# Patient Record
Sex: FEMALE | Race: WHITE | Employment: FULL TIME | URBAN - METROPOLITAN AREA
[De-identification: names, ages, dates, MRNs, and addresses within clinical notes are randomized per-mention and may not be internally consistent; named-entity substitution may affect disease eponyms.]

---

## 2017-03-20 ENCOUNTER — OFFICE VISIT (OUTPATIENT)
Dept: FAMILY MEDICINE CLINIC | Age: 37
End: 2017-03-20
Payer: COMMERCIAL

## 2017-03-20 VITALS
WEIGHT: 222 LBS | TEMPERATURE: 98.5 F | SYSTOLIC BLOOD PRESSURE: 135 MMHG | HEIGHT: 69 IN | HEART RATE: 86 BPM | RESPIRATION RATE: 16 BRPM | BODY MASS INDEX: 32.88 KG/M2 | DIASTOLIC BLOOD PRESSURE: 80 MMHG

## 2017-03-20 DIAGNOSIS — J02.0 ACUTE STREPTOCOCCAL PHARYNGITIS: Primary | ICD-10-CM

## 2017-03-20 PROCEDURE — 99213 OFFICE O/P EST LOW 20 MIN: CPT | Performed by: FAMILY MEDICINE

## 2017-03-20 RX ORDER — AMOXICILLIN AND CLAVULANATE POTASSIUM 875; 125 MG/1; MG/1
1 TABLET, FILM COATED ORAL 2 TIMES DAILY WITH MEALS
Qty: 20 TABLET | Refills: 0 | Status: SHIPPED | OUTPATIENT
Start: 2017-03-20 | End: 2017-03-30

## 2018-01-18 ENCOUNTER — HOSPITAL ENCOUNTER (OUTPATIENT)
Age: 38
Setting detail: SPECIMEN
Discharge: HOME | End: 2018-01-18
Payer: COMMERCIAL

## 2018-01-18 ENCOUNTER — OFFICE VISIT (OUTPATIENT)
Dept: FAMILY MEDICINE CLINIC | Age: 38
End: 2018-01-18
Payer: COMMERCIAL

## 2018-01-18 VITALS
DIASTOLIC BLOOD PRESSURE: 94 MMHG | WEIGHT: 222 LBS | HEIGHT: 69 IN | RESPIRATION RATE: 16 BRPM | HEART RATE: 70 BPM | BODY MASS INDEX: 32.88 KG/M2 | SYSTOLIC BLOOD PRESSURE: 144 MMHG

## 2018-01-18 DIAGNOSIS — Z01.419 ENCOUNTER FOR GYNECOLOGICAL EXAMINATION (GENERAL) (ROUTINE) WITHOUT ABNORMAL FINDINGS: ICD-10-CM

## 2018-01-18 DIAGNOSIS — F43.21 GRIEF REACTION: ICD-10-CM

## 2018-01-18 DIAGNOSIS — Z12.31 SCREENING MAMMOGRAM, ENCOUNTER FOR: ICD-10-CM

## 2018-01-18 DIAGNOSIS — Z01.419 ENCOUNTER FOR GYNECOLOGICAL EXAMINATION (GENERAL) (ROUTINE) WITHOUT ABNORMAL FINDINGS: Primary | ICD-10-CM

## 2018-01-18 LAB
ABSOLUTE EOS #: 0.14 K/UL (ref 0–0.44)
ABSOLUTE IMMATURE GRANULOCYTE: 0.03 K/UL (ref 0–0.3)
ABSOLUTE LYMPH #: 1.92 K/UL (ref 1.1–3.7)
ABSOLUTE MONO #: 0.51 K/UL (ref 0.1–1.2)
BASOPHILS # BLD: 1 % (ref 0–2)
BASOPHILS ABSOLUTE: 0.06 K/UL (ref 0–0.2)
DIFFERENTIAL TYPE: NORMAL
EOSINOPHILS RELATIVE PERCENT: 2 % (ref 1–4)
HCT VFR BLD CALC: 44.2 % (ref 36.3–47.1)
HEMOGLOBIN: 14.7 G/DL (ref 11.9–15.1)
IMMATURE GRANULOCYTES: 0 %
LYMPHOCYTES # BLD: 28 % (ref 24–43)
MCH RBC QN AUTO: 30.9 PG (ref 25.2–33.5)
MCHC RBC AUTO-ENTMCNC: 33.3 G/DL (ref 28.4–34.8)
MCV RBC AUTO: 92.9 FL (ref 82.6–102.9)
MONOCYTES # BLD: 8 % (ref 3–12)
NRBC AUTOMATED: 0 PER 100 WBC
PDW BLD-RTO: 12.2 % (ref 11.8–14.4)
PLATELET # BLD: 312 K/UL (ref 138–453)
PLATELET ESTIMATE: NORMAL
PMV BLD AUTO: 11.4 FL (ref 8.1–13.5)
PTH INTACT: 51.57 PG/ML (ref 15–65)
RBC # BLD: 4.76 M/UL (ref 3.95–5.11)
RBC # BLD: NORMAL 10*6/UL
SEG NEUTROPHILS: 61 % (ref 36–65)
SEGMENTED NEUTROPHILS ABSOLUTE COUNT: 4.14 K/UL (ref 1.5–8.1)
THYROXINE, FREE: 1.33 NG/DL (ref 0.93–1.7)
TSH SERPL DL<=0.05 MIU/L-ACNC: 1.92 MIU/L (ref 0.3–5)
VITAMIN D 25-HYDROXY: 31.7 NG/ML (ref 30–100)
WBC # BLD: 6.8 K/UL (ref 3.5–11.3)
WBC # BLD: NORMAL 10*3/UL

## 2018-01-18 PROCEDURE — 99395 PREV VISIT EST AGE 18-39: CPT | Performed by: FAMILY MEDICINE

## 2018-01-18 RX ORDER — ESCITALOPRAM OXALATE 10 MG/1
10 TABLET ORAL DAILY
Qty: 30 TABLET | Refills: 11 | Status: SHIPPED | OUTPATIENT
Start: 2018-01-18 | End: 2019-06-12

## 2018-01-18 ASSESSMENT — PATIENT HEALTH QUESTIONNAIRE - PHQ9
SUM OF ALL RESPONSES TO PHQ QUESTIONS 1-9: 2
2. FEELING DOWN, DEPRESSED OR HOPELESS: 1
1. LITTLE INTEREST OR PLEASURE IN DOING THINGS: 1
SUM OF ALL RESPONSES TO PHQ QUESTIONS 1-9: 1
2. FEELING DOWN, DEPRESSED OR HOPELESS: 1
SUM OF ALL RESPONSES TO PHQ9 QUESTIONS 1 & 2: 1
1. LITTLE INTEREST OR PLEASURE IN DOING THINGS: 0
SUM OF ALL RESPONSES TO PHQ9 QUESTIONS 1 & 2: 2

## 2018-01-19 LAB — THYROID PEROXIDASE (TPO) AB: 13.6 IU/ML (ref 0–35)

## 2018-01-26 ENCOUNTER — HOSPITAL ENCOUNTER (OUTPATIENT)
Dept: MAMMOGRAPHY | Age: 38
Discharge: HOME | End: 2018-01-26
Payer: COMMERCIAL

## 2018-01-26 DIAGNOSIS — Z12.31 SCREENING MAMMOGRAM, ENCOUNTER FOR: ICD-10-CM

## 2018-01-26 PROCEDURE — 77063 BREAST TOMOSYNTHESIS BI: CPT

## 2018-01-30 LAB — CYTOLOGY REPORT: NORMAL

## 2018-01-31 ENCOUNTER — PATIENT MESSAGE (OUTPATIENT)
Dept: FAMILY MEDICINE CLINIC | Age: 38
End: 2018-01-31

## 2018-02-02 ENCOUNTER — HOSPITAL ENCOUNTER (OUTPATIENT)
Dept: ULTRASOUND IMAGING | Age: 38
Discharge: HOME | End: 2018-02-04
Payer: COMMERCIAL

## 2018-02-02 DIAGNOSIS — R92.8 ABNORMAL MAMMOGRAM: ICD-10-CM

## 2018-02-02 PROCEDURE — 76642 ULTRASOUND BREAST LIMITED: CPT

## 2018-02-13 ENCOUNTER — OFFICE VISIT (OUTPATIENT)
Dept: FAMILY MEDICINE CLINIC | Age: 38
End: 2018-02-13
Payer: COMMERCIAL

## 2018-02-13 VITALS
BODY MASS INDEX: 32.58 KG/M2 | DIASTOLIC BLOOD PRESSURE: 76 MMHG | WEIGHT: 220 LBS | SYSTOLIC BLOOD PRESSURE: 122 MMHG | HEART RATE: 72 BPM | RESPIRATION RATE: 16 BRPM | HEIGHT: 69 IN

## 2018-02-13 DIAGNOSIS — M79.671 FOOT PAIN, RIGHT: Primary | ICD-10-CM

## 2018-02-13 PROCEDURE — 99213 OFFICE O/P EST LOW 20 MIN: CPT | Performed by: FAMILY MEDICINE

## 2018-02-13 NOTE — PROGRESS NOTES
[facility]  COMPREHENSIVE CARE  [phone]  [address] [address]  Dept: [phone]      Ladonna Patel is a 44 y.o. female who presents today for follow up on her  medical conditions as noted below. Chief Complaint   Patient presents with    Foot Pain     right foot pain. slipped on water-she did not fall but the side of her foot hurts. Patient Active Problem List:     Seasonal allergies     Migraine     Encounter for routine gynecological examination     Past Medical History:   Diagnosis Date    Headache(784.0)     migraines.  Seasonal allergies       Past Surgical History:   Procedure Laterality Date    TUBAL LIGATION  11/25/13    WISDOM TOOTH EXTRACTION       Family History   Problem Relation Age of Onset    Other Mother      kidney stones    Migraines Mother     Colon Polyps Father     Other Father      colitis    Migraines Father     Cancer Paternal Grandfather      pancreas    Cancer Paternal Grandmother      brain    Heart Disease Maternal Grandmother     Hypertension Maternal Grandmother     Hypertension Maternal Grandfather     Cancer Paternal Aunt      ovarian    Migraines Sister     Cancer Sister     Migraines Sister        Current Outpatient Prescriptions   Medication Sig Dispense Refill    Multiple Vitamins-Minerals (MULTIVITAMIN PO) Take  by mouth.  escitalopram (LEXAPRO) 10 MG tablet Take 1 tablet by mouth daily 30 tablet 11     No current facility-administered medications for this visit.       ALLERGIES:  No Known Allergies    Social History   Substance Use Topics    Smoking status: Never Smoker    Smokeless tobacco: Never Used    Alcohol use Yes      Comment: socially        No results found for: LDLCALC, LDLCHOLESTEROL, HDL, BUN, CREATININE, GLUCOSE, LABA1C, LABMICR           Subjective:      HPI  She is here today stating that she had slipped on some water injuring her lateral right foot area this is approximately 1 week

## 2018-02-14 DIAGNOSIS — M79.671 FOOT PAIN, RIGHT: ICD-10-CM

## 2018-03-27 ENCOUNTER — OFFICE VISIT (OUTPATIENT)
Dept: FAMILY MEDICINE CLINIC | Age: 38
End: 2018-03-27
Payer: COMMERCIAL

## 2018-03-27 VITALS
RESPIRATION RATE: 16 BRPM | DIASTOLIC BLOOD PRESSURE: 79 MMHG | OXYGEN SATURATION: 99 % | BODY MASS INDEX: 32.38 KG/M2 | WEIGHT: 218.6 LBS | TEMPERATURE: 97.7 F | SYSTOLIC BLOOD PRESSURE: 130 MMHG | HEART RATE: 68 BPM | HEIGHT: 69 IN

## 2018-03-27 DIAGNOSIS — J01.90 ACUTE NON-RECURRENT SINUSITIS, UNSPECIFIED LOCATION: Primary | ICD-10-CM

## 2018-03-27 PROCEDURE — 99213 OFFICE O/P EST LOW 20 MIN: CPT | Performed by: FAMILY MEDICINE

## 2018-03-27 RX ORDER — AZITHROMYCIN 250 MG/1
TABLET, FILM COATED ORAL
Qty: 6 TABLET | Refills: 0 | Status: SHIPPED | OUTPATIENT
Start: 2018-03-27 | End: 2018-04-06

## 2018-07-02 ENCOUNTER — OFFICE VISIT (OUTPATIENT)
Dept: FAMILY MEDICINE CLINIC | Age: 38
End: 2018-07-02
Payer: COMMERCIAL

## 2018-07-02 VITALS
OXYGEN SATURATION: 98 % | SYSTOLIC BLOOD PRESSURE: 136 MMHG | DIASTOLIC BLOOD PRESSURE: 89 MMHG | RESPIRATION RATE: 16 BRPM | HEART RATE: 87 BPM

## 2018-07-02 DIAGNOSIS — S83.242A ACUTE MEDIAL MENISCUS TEAR OF LEFT KNEE, INITIAL ENCOUNTER: Primary | ICD-10-CM

## 2018-07-02 PROCEDURE — 99213 OFFICE O/P EST LOW 20 MIN: CPT | Performed by: FAMILY MEDICINE

## 2018-07-02 RX ORDER — TRAMADOL HYDROCHLORIDE 50 MG/1
50 TABLET ORAL 2 TIMES DAILY
Qty: 12 TABLET | Refills: 0 | Status: SHIPPED | OUTPATIENT
Start: 2018-07-02 | End: 2018-07-08

## 2018-07-02 NOTE — PROGRESS NOTES
Subjective:      Farideh Jang is a 44 y.o. female who presents with a knee injury involving the left knee. Onset was sudden, related to recreational sports. Mechanism of injury: extension/flexion. Inciting event: injured while playing volley ball. Current symptoms include: pain located medial lig, popping sensation and swelling. Pain is aggravated by any weight bearing. Patient has had no prior knee problems. Evaluation to date: none. Treatment to date: avoidance of offending activity, ice, prescription NSAIDS which are not very effective and rest.    Patient's medications, allergies, past medical, surgical, social and family histories were reviewed and updated as appropriate. Review of Systems  Pertinent items are noted in HPI. Objective:      /89   Pulse 87   Resp 16   SpO2 98%   Right knee: normal and no effusion, full active range of motion, no joint line tenderness, ligamentous structures intact. Left knee:  positive exam findings: effusion, medial joint line tenderness, erythema and tenderness noted medial, distal     X-ray left knee: not ordered. Luis Cid was seen today for knee injury. Diagnoses and all orders for this visit:    Acute medial meniscus tear of left knee, initial encounter  -     MRI KNEE LEFT W WO CONTRAST; Future  -     [address]  -     traMADol (ULTRAM) 50 MG tablet; Take 1 tablet by mouth 2 times daily for 6 days. Intended supply: 3 days. Take lowest dose possible to manage pain. this patient had an acute knee injury. She heardt to pop. I will order MRI. She will follow up with orthopedic surgeon. Continue ice, continue elevation take tramadol and also NSAIDs for pain relief. Call or return to clinic prn if these symptoms worsen or fail to improve as anticipated. I have reviewed the instructions with the patient, answering all questions to her satisfaction.

## 2018-07-03 DIAGNOSIS — S83.242A ACUTE MEDIAL MENISCUS TEAR OF LEFT KNEE, INITIAL ENCOUNTER: Primary | ICD-10-CM

## 2018-07-10 ENCOUNTER — HOSPITAL ENCOUNTER (OUTPATIENT)
Dept: MRI IMAGING | Age: 38
Discharge: HOME | End: 2018-07-12
Payer: COMMERCIAL

## 2018-07-10 DIAGNOSIS — S83.242A ACUTE MEDIAL MENISCUS TEAR OF LEFT KNEE, INITIAL ENCOUNTER: ICD-10-CM

## 2018-07-10 PROCEDURE — 73721 MRI JNT OF LWR EXTRE W/O DYE: CPT

## 2019-02-13 ENCOUNTER — OFFICE VISIT (OUTPATIENT)
Dept: FAMILY MEDICINE CLINIC | Age: 39
End: 2019-02-13
Payer: COMMERCIAL

## 2019-02-13 VITALS
BODY MASS INDEX: 34.51 KG/M2 | HEIGHT: 69 IN | TEMPERATURE: 98 F | WEIGHT: 233 LBS | HEART RATE: 80 BPM | RESPIRATION RATE: 16 BRPM | DIASTOLIC BLOOD PRESSURE: 87 MMHG | SYSTOLIC BLOOD PRESSURE: 134 MMHG

## 2019-02-13 DIAGNOSIS — J01.90 ACUTE NON-RECURRENT SINUSITIS, UNSPECIFIED LOCATION: Primary | ICD-10-CM

## 2019-02-13 PROCEDURE — 99213 OFFICE O/P EST LOW 20 MIN: CPT | Performed by: FAMILY MEDICINE

## 2019-02-13 RX ORDER — AMOXICILLIN AND CLAVULANATE POTASSIUM 875; 125 MG/1; MG/1
1 TABLET, FILM COATED ORAL 2 TIMES DAILY WITH MEALS
Qty: 20 TABLET | Refills: 0 | Status: SHIPPED | OUTPATIENT
Start: 2019-02-13 | End: 2019-02-23

## 2019-02-13 ASSESSMENT — PATIENT HEALTH QUESTIONNAIRE - PHQ9
SUM OF ALL RESPONSES TO PHQ9 QUESTIONS 1 & 2: 0
SUM OF ALL RESPONSES TO PHQ QUESTIONS 1-9: 0
2. FEELING DOWN, DEPRESSED OR HOPELESS: 0
SUM OF ALL RESPONSES TO PHQ QUESTIONS 1-9: 0
1. LITTLE INTEREST OR PLEASURE IN DOING THINGS: 0

## 2019-06-12 ENCOUNTER — OFFICE VISIT (OUTPATIENT)
Dept: FAMILY MEDICINE CLINIC | Age: 39
End: 2019-06-12
Payer: COMMERCIAL

## 2019-06-12 VITALS
WEIGHT: 232 LBS | HEIGHT: 69 IN | SYSTOLIC BLOOD PRESSURE: 134 MMHG | HEART RATE: 76 BPM | BODY MASS INDEX: 34.36 KG/M2 | RESPIRATION RATE: 16 BRPM | DIASTOLIC BLOOD PRESSURE: 89 MMHG

## 2019-06-12 DIAGNOSIS — Z00.00 WELL ADULT EXAM: ICD-10-CM

## 2019-06-12 DIAGNOSIS — J06.9 ACUTE URI: Primary | ICD-10-CM

## 2019-06-12 PROBLEM — S82.109A CLOSED FRACTURE OF UPPER END OF TIBIA: Status: ACTIVE | Noted: 2019-06-12

## 2019-06-12 PROCEDURE — 99213 OFFICE O/P EST LOW 20 MIN: CPT | Performed by: FAMILY MEDICINE

## 2019-06-12 RX ORDER — DOXYCYCLINE HYCLATE 100 MG
100 TABLET ORAL 2 TIMES DAILY
Qty: 20 TABLET | Refills: 0 | Status: SHIPPED | OUTPATIENT
Start: 2019-06-12 | End: 2019-06-22

## 2019-06-12 ASSESSMENT — PATIENT HEALTH QUESTIONNAIRE - PHQ9
1. LITTLE INTEREST OR PLEASURE IN DOING THINGS: 0
SUM OF ALL RESPONSES TO PHQ QUESTIONS 1-9: 0
SUM OF ALL RESPONSES TO PHQ QUESTIONS 1-9: 0
2. FEELING DOWN, DEPRESSED OR HOPELESS: 0
SUM OF ALL RESPONSES TO PHQ9 QUESTIONS 1 & 2: 0

## 2019-06-12 NOTE — PROGRESS NOTES
just cannot quit coughing she tried Mucinex allergy meds and then it seemed to be helping her    Review of Systems:     Constitutional: Negative for fever, appetite change and fatigue. Family social and medical history reviewed and unchanged     HENT: Negative. Negative for nosebleeds, trouble swallowing and neck pain. Eyes: Negative for photophobia and visual disturbance. Respiratory: Negative. Negative for chest tightness and shortness of breath. Cardiovascular: Negative. Negative for chest pain and leg swelling. Gastrointestinal: Negative. Negative for abdominal pain and blood in stool. Endocrine: Negative for cold intolerance and polyuria. Genitourinary: Negative for dysuria and hematuria. Musculoskeletal: Negative. Skin: Negative for rash. Allergic/Immunologic: Negative. Neurological: Negative. Negative for dizziness, weakness and numbness. Hematological: Negative. Negative for adenopathy. Does not bruise/bleed easily. Psychiatric/Behavioral: Negative for sleep disturbance, dysphoric mood and  decreased concentration. The patient is not nervous/anxious. Objective:     Physical Exam:     Nursing note and vitals reviewed. /89   Pulse 76   Resp 16   Ht 5' 8.9\" (1.75 m)   Wt 232 lb (105.2 kg)   Breastfeeding? No   BMI 34.36 kg/m²   Constitutional: She is oriented to person, place, and time. She   appears well-developed and well-nourished. HENT:   Head: Normocephalic and atraumatic. Right Ear: External ear normal. Tympanic membrane is not erythematous. No middle ear effusion. Left Ear: External ear normal. Tympanic membrane is not erythematous. No middle ear effusion. Nose: No mucosal edema. Mouth/Throat: Oropharynx is clear and moist. No posterior oropharyngeal erythema. Eyes: Conjunctivae and EOM are normal. Pupils are equal, round, and reactive to light. Neck: Normal range of motion. Neck supple. No thyromegaly present. Cardiovascular: Normal rate, regular rhythm and normal heart sounds. No murmur heard. Pulmonary/Chest: Effort normal and breath sounds normal. She has no wheezes. Shehas no rales. Abdominal: Soft. Bowel sounds are normal. She exhibits no distension and no mass. There is no tenderness. There is no rebound and no guarding. Genitourinary/Anorectal:deferred  Musculoskeletal: Normal range of motion. She exhibits no edema or tenderness. Lymphadenopathy: She has no cervical adenopathy. Neurological: She is alert and oriented to person, place, and time. She has normal reflexes. Skin: Skin is warm and dry. No rash noted. Psychiatric: She has a normal mood and affect. Her   behavior is normal.       Assessment:      1. Acute URI    2. Well adult exam          Plan:      Call or return to clinic prn if these symptoms worsen or fail to improve as anticipated. I have reviewed the instructions with the patient, answering all questions to her satisfaction. No follow-ups on file.   Orders Placed This Encounter   Procedures    CBC Auto Differential     Standing Status:   Future     Standing Expiration Date:   6/11/2020    Comprehensive Metabolic Panel     Standing Status:   Future     Standing Expiration Date:   6/11/2020    Lipid Panel     Standing Status:   Future     Standing Expiration Date:   6/11/2020     Order Specific Question:   Is Patient Fasting?/# of Hours     Answer:   yes    T4, Free     Standing Status:   Future     Standing Expiration Date:   6/11/2020    Thyroid Peroxidase Antibody     Standing Status:   Future     Standing Expiration Date:   6/11/2020    TSH without Reflex     Standing Status:   Future     Standing Expiration Date:   6/11/2020    Vitamin D 25 Hydroxy     Standing Status:   Future     Standing Expiration Date:   6/12/2020    PTH, Intact     Standing Status:   Future     Standing Expiration Date:   6/12/2020     Orders Placed This Encounter   Medications    doxycycline hyclate (VIBRA-TABS) 100 MG tablet     Sig: Take 1 tablet by mouth 2 times daily for 10 days     Dispense:  20 tablet     Refill:  0     Cont Mucinex   Electronically signed by Austin Pham DO on 6/12/2019 at 2:00 PM

## 2019-09-20 ENCOUNTER — OFFICE VISIT (OUTPATIENT)
Dept: ORTHOPEDIC SURGERY | Age: 39
End: 2019-09-20
Payer: COMMERCIAL

## 2019-09-20 VITALS
HEIGHT: 69 IN | SYSTOLIC BLOOD PRESSURE: 138 MMHG | WEIGHT: 238.8 LBS | DIASTOLIC BLOOD PRESSURE: 88 MMHG | BODY MASS INDEX: 35.37 KG/M2

## 2019-09-20 DIAGNOSIS — M25.562 LEFT KNEE PAIN, UNSPECIFIED CHRONICITY: Primary | ICD-10-CM

## 2019-09-20 PROCEDURE — 20611 DRAIN/INJ JOINT/BURSA W/US: CPT | Performed by: PHYSICIAN ASSISTANT

## 2019-09-20 PROCEDURE — 99213 OFFICE O/P EST LOW 20 MIN: CPT | Performed by: PHYSICIAN ASSISTANT

## 2019-09-20 RX ORDER — METHYLPREDNISOLONE ACETATE 40 MG/ML
40 INJECTION, SUSPENSION INTRA-ARTICULAR; INTRALESIONAL; INTRAMUSCULAR; SOFT TISSUE ONCE
Status: COMPLETED | OUTPATIENT
Start: 2019-09-20 | End: 2019-09-20

## 2019-09-20 RX ORDER — IBUPROFEN 200 MG
200 TABLET ORAL PRN
COMMUNITY

## 2019-09-20 RX ORDER — COVID-19 ANTIGEN TEST
KIT MISCELLANEOUS PRN
COMMUNITY
End: 2021-08-11

## 2019-09-20 RX ORDER — LIDOCAINE HYDROCHLORIDE 10 MG/ML
4 INJECTION, SOLUTION INFILTRATION; PERINEURAL ONCE
Status: COMPLETED | OUTPATIENT
Start: 2019-09-20 | End: 2019-09-20

## 2019-09-20 RX ADMIN — METHYLPREDNISOLONE ACETATE 40 MG: 40 INJECTION, SUSPENSION INTRA-ARTICULAR; INTRALESIONAL; INTRAMUSCULAR; SOFT TISSUE at 12:16

## 2019-09-20 RX ADMIN — LIDOCAINE HYDROCHLORIDE 4 ML: 10 INJECTION, SOLUTION INFILTRATION; PERINEURAL at 12:15

## 2019-09-20 ASSESSMENT — ENCOUNTER SYMPTOMS
CONSTIPATION: 0
SHORTNESS OF BREATH: 0
ABDOMINAL PAIN: 0
VOMITING: 0
DIARRHEA: 0
CHEST TIGHTNESS: 0
COUGH: 0
ABDOMINAL DISTENTION: 0
APNEA: 0
NAUSEA: 0
COLOR CHANGE: 0

## 2019-09-20 NOTE — PROGRESS NOTES
Negative. There is  No varus instability at 0 degrees and No varus instability at 30 degrees. There is No valgus instability at 0 degrees and No valgus instability at 30 degrees. SPECIAL: Trent test is positive with no clunks and no pain but there is crepitation. PALP: There is medial joint line pain. Medial tibial pain. Assessment:     1. Left knee pain, unspecified chronicity      Procedures:    Procedure: yes    Regular Knee Injection    Location: Left Knee  Procedure: Corticosteroid injection into the knee. The patient was placed in the Supine position on the exam table. The superior lateral portal was identified and marked. The skin was prepped with betadine in a sterile fashion. Utilizing ultrasound for precise placement and clean technique with sterile gloves a 5cc solution containing 4cc of 1.0% Lidocaine with 1cc containing 40mg of Depo-medrol  was injected. There was no resistance to the injection. The wound was cleansed and a band-aid was placed. the patient tolerated the procedure without difficulty. Adverse reactions to the injection were discussed with the patient including signs of infection (increasing pain, redness, swelling) and the patient was instructed to call immediately if experiencing any of these symptoms. Radiology:   Xr Knee Left (min 4 Views)    Result Date: 9/22/2019  KNEE X-RAY 4 views of the left knee including AP, bilateral tunnel, and lateral in the upright position, and skyline views reveal anatomic alignment with no fracture or dislocation. Kellgren grade II changes of osteoarthritis (joint space narrowing, osteophyte, subchondral sclerosis, bony deformity/cyst) of the tricompartment(s). No osseous loose bodies. No bony erosion or periosteal reaction. No soft tissue masses. 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       Plan:   Treatment : I reviewed the X-ray with the patient and I informed her that the knee has some mild degenerative changes with a Reason for exam:     Answer:   pain       I, Edward Day V, am scribing for and in the presence of  Leanna English ATC. 9/23/2019  9:45 AM      I, Laury Zimmerman PA-C, TYLER,  have personally seen this patient, reviewed the chart including history, and imaging if done. I personally  performed the physical exam and obtained any needed additional history. I placed orders, performed or supervised procedures and developed the treatment plan.     Electronically signed by Conrad Alvarado PA-C, on 9/23/2019 at 9:46 AM      Electronically signed by Conrad Alvarado PA-C, on 9/23/2019 at 9:45 AM

## 2019-09-20 NOTE — PATIENT INSTRUCTIONS
The injection site should never get red, hot, or swollen and if it does the patient will contact our office right away. The patient may experience a increase in soreness the first 24-48 hours due to a cortisone flair and can take anti-inflammatories for a short period of time to reduce that soreness. The patient should not submerge the injection site in water for a minimum of 24 hours to avoid infection. This means no lakes, pools, ponds, or hot tubs for 24 hours. If the patient is diabetic the injection may increase their blood sugar for up to one week. The patient can do this cortisone injection once every 3 months as needed.

## 2019-12-17 ENCOUNTER — OFFICE VISIT (OUTPATIENT)
Dept: FAMILY MEDICINE CLINIC | Age: 39
End: 2019-12-17
Payer: COMMERCIAL

## 2019-12-17 VITALS
BODY MASS INDEX: 34.98 KG/M2 | HEART RATE: 98 BPM | WEIGHT: 236.2 LBS | DIASTOLIC BLOOD PRESSURE: 85 MMHG | HEIGHT: 69 IN | RESPIRATION RATE: 16 BRPM | OXYGEN SATURATION: 97 % | SYSTOLIC BLOOD PRESSURE: 133 MMHG

## 2019-12-17 DIAGNOSIS — J10.1 INFLUENZA A: Primary | ICD-10-CM

## 2019-12-17 LAB
INFLUENZA A ANTIBODY: NORMAL
INFLUENZA B ANTIBODY: NORMAL

## 2019-12-17 PROCEDURE — 87804 INFLUENZA ASSAY W/OPTIC: CPT | Performed by: FAMILY MEDICINE

## 2019-12-17 PROCEDURE — 99213 OFFICE O/P EST LOW 20 MIN: CPT | Performed by: FAMILY MEDICINE

## 2019-12-17 RX ORDER — OSELTAMIVIR PHOSPHATE 75 MG/1
75 CAPSULE ORAL 2 TIMES DAILY
Qty: 10 CAPSULE | Refills: 0 | Status: SHIPPED | OUTPATIENT
Start: 2019-12-17 | End: 2019-12-22

## 2019-12-17 ASSESSMENT — PATIENT HEALTH QUESTIONNAIRE - PHQ9
1. LITTLE INTEREST OR PLEASURE IN DOING THINGS: 0
SUM OF ALL RESPONSES TO PHQ9 QUESTIONS 1 & 2: 0
SUM OF ALL RESPONSES TO PHQ QUESTIONS 1-9: 0
2. FEELING DOWN, DEPRESSED OR HOPELESS: 0
SUM OF ALL RESPONSES TO PHQ QUESTIONS 1-9: 0

## 2021-04-19 ENCOUNTER — TELEPHONE (OUTPATIENT)
Dept: FAMILY MEDICINE CLINIC | Age: 41
End: 2021-04-19

## 2021-08-11 ENCOUNTER — OFFICE VISIT (OUTPATIENT)
Dept: FAMILY MEDICINE CLINIC | Age: 41
End: 2021-08-11
Payer: COMMERCIAL

## 2021-08-11 VITALS
BODY MASS INDEX: 35.37 KG/M2 | OXYGEN SATURATION: 97 % | SYSTOLIC BLOOD PRESSURE: 134 MMHG | HEIGHT: 69 IN | HEART RATE: 95 BPM | DIASTOLIC BLOOD PRESSURE: 82 MMHG | WEIGHT: 238.8 LBS

## 2021-08-11 DIAGNOSIS — J30.2 SEASONAL ALLERGIES: ICD-10-CM

## 2021-08-11 DIAGNOSIS — G43.C0 PERIODIC HEADACHE SYNDROME, NOT INTRACTABLE: ICD-10-CM

## 2021-08-11 DIAGNOSIS — Z13.1 SCREENING FOR DIABETES MELLITUS: ICD-10-CM

## 2021-08-11 DIAGNOSIS — Z12.31 SCREENING MAMMOGRAM, ENCOUNTER FOR: ICD-10-CM

## 2021-08-11 DIAGNOSIS — Z00.00 WELL ADULT EXAM: ICD-10-CM

## 2021-08-11 DIAGNOSIS — M72.2 PLANTAR FASCIITIS, RIGHT: Primary | ICD-10-CM

## 2021-08-11 PROCEDURE — 99213 OFFICE O/P EST LOW 20 MIN: CPT | Performed by: FAMILY MEDICINE

## 2021-08-11 SDOH — ECONOMIC STABILITY: FOOD INSECURITY: WITHIN THE PAST 12 MONTHS, THE FOOD YOU BOUGHT JUST DIDN'T LAST AND YOU DIDN'T HAVE MONEY TO GET MORE.: NEVER TRUE

## 2021-08-11 SDOH — ECONOMIC STABILITY: FOOD INSECURITY: WITHIN THE PAST 12 MONTHS, YOU WORRIED THAT YOUR FOOD WOULD RUN OUT BEFORE YOU GOT MONEY TO BUY MORE.: NEVER TRUE

## 2021-08-11 ASSESSMENT — PATIENT HEALTH QUESTIONNAIRE - PHQ9
SUM OF ALL RESPONSES TO PHQ QUESTIONS 1-9: 0
1. LITTLE INTEREST OR PLEASURE IN DOING THINGS: 0
SUM OF ALL RESPONSES TO PHQ9 QUESTIONS 1 & 2: 0
2. FEELING DOWN, DEPRESSED OR HOPELESS: 0
SUM OF ALL RESPONSES TO PHQ QUESTIONS 1-9: 0
SUM OF ALL RESPONSES TO PHQ QUESTIONS 1-9: 0

## 2021-08-11 ASSESSMENT — SOCIAL DETERMINANTS OF HEALTH (SDOH): HOW HARD IS IT FOR YOU TO PAY FOR THE VERY BASICS LIKE FOOD, HOUSING, MEDICAL CARE, AND HEATING?: NOT HARD AT ALL

## 2021-08-11 NOTE — PROGRESS NOTES
[place]  [facility]  [id number] [facility] [address] [place] [address]  Dept: [phone]      Jennifer Chavarria is a 42 y.o. female who presents today for follow up on her  medical conditions as noted below. Chief Complaint   Patient presents with    Foot Pain     Right        Patient Active Problem List:     Seasonal allergies     Migraine     Closed fracture of upper end of tibia     Past Medical History:   Diagnosis Date    Headache(784.0)     migraines.  Seasonal allergies       Past Surgical History:   Procedure Laterality Date    TUBAL LIGATION  11/25/13    WISDOM TOOTH EXTRACTION       Family History   Problem Relation Age of Onset    Other Mother         kidney stones    Migraines Mother     Colon Polyps Father     Other Father         colitis    Migraines Father     Cancer Paternal Grandfather         pancreas    Cancer Paternal Grandmother         brain    Heart Disease Maternal Grandmother     Hypertension Maternal Grandmother     Hypertension Maternal Grandfather     Cancer Paternal Aunt         ovarian    Migraines Sister     Cancer Sister     Migraines Sister        Current Outpatient Medications   Medication Sig Dispense Refill    ibuprofen (ADVIL;MOTRIN) 200 MG tablet Take 200 mg by mouth as needed for Pain      Multiple Vitamins-Minerals (MULTIVITAMIN PO) Take  by mouth. No current facility-administered medications for this visit.      ALLERGIES:  No Known Allergies    Social History     Tobacco Use    Smoking status: Never Smoker    Smokeless tobacco: Never Used   Substance Use Topics    Alcohol use: Yes     Comment: socially        No results found for: LDLCALC, LDLCHOLESTEROL, HDL, BUN, CREATININE, GLUCOSE, LABA1C, LABMICR           Subjective:      HPI  He is here today stating for the last month or more she has been having pain on the bottom of her foot is worse when she first steps on it in the morning notes increasing in severity and hurting on her lateral foot and up into her Achilles tendon she does admit she wears flip-flops a lot lately she has been trying to wear tennis shoes  She has been seen in the office for over 2 almost 3years and has not had laboratory studies done a mammogram or Pap smear she states overall she has been feeling okay though    Review of Systems:     Constitutional: Negative for fever, appetite change and fatigue. Family social and medical history reviewed and unchanged     HENT: Negative. Negative for nosebleeds, trouble swallowing and neck pain. Eyes: Negative for photophobia and visual disturbance. Respiratory: Negative. Negative for chest tightness and shortness of breath. Cardiovascular: Negative. Negative for chest pain and leg swelling. Gastrointestinal: Negative. Negative for abdominal pain and blood in stool. Endocrine: Negative for cold intolerance and polyuria. Genitourinary: Negative for dysuria and hematuria. Musculoskeletal: Negative. Skin: Negative for rash. Allergic/Immunologic: Negative. Neurological: Negative. Negative for dizziness, weakness and numbness. Hematological: Negative. Negative for adenopathy. Does not bruise/bleed easily. Psychiatric/Behavioral: Negative for sleep disturbance, dysphoric mood and  decreased concentration. The patient is not nervous/anxious. Objective:     Physical Exam:     Nursing note and vitals reviewed. /82   Pulse 95   Ht 5' 9\" (1.753 m)   Wt 238 lb 12.8 oz (108.3 kg)   SpO2 97%   BMI 35.26 kg/m²   Constitutional: She is oriented to person, place, and time. She   appears well-developed and well-nourished. HENT:   Head: Normocephalic and atraumatic. Right Ear: External ear normal. Tympanic membrane is not erythematous. No middle ear effusion. Left Ear: External ear normal. Tympanic membrane is not erythematous. No middle ear effusion. Nose: No mucosal edema. 2/11/2022    T4, Free     Standing Status:   Future     Standing Expiration Date:   2/11/2022    Lipid Panel     Standing Status:   Future     Standing Expiration Date:   2/11/2022     Order Specific Question:   Is Patient Fasting?/# of Hours     Answer:   yes    TSH without Reflex     Standing Status:   Future     Standing Expiration Date:   2/11/2022    Vitamin D 25 Hydroxy     Standing Status:   Future     Standing Expiration Date:   8/11/2022     No orders of the defined types were placed in this encounter.   Plantar fasciitis exercises  No flip-flops and good supportive tennis shoes    Electronically signed by London Reyes DO on 8/11/2021 at 4:38 PM

## 2022-02-11 ENCOUNTER — OFFICE VISIT (OUTPATIENT)
Dept: FAMILY MEDICINE CLINIC | Age: 42
End: 2022-02-11
Payer: COMMERCIAL

## 2022-02-11 ENCOUNTER — NURSE TRIAGE (OUTPATIENT)
Dept: OTHER | Facility: CLINIC | Age: 42
End: 2022-02-11

## 2022-02-11 VITALS
WEIGHT: 239.4 LBS | HEART RATE: 78 BPM | BODY MASS INDEX: 35.46 KG/M2 | DIASTOLIC BLOOD PRESSURE: 97 MMHG | SYSTOLIC BLOOD PRESSURE: 142 MMHG | HEIGHT: 69 IN | OXYGEN SATURATION: 97 %

## 2022-02-11 DIAGNOSIS — R42 VERTIGO: Primary | ICD-10-CM

## 2022-02-11 DIAGNOSIS — R03.0 ELEVATED BLOOD PRESSURE READING: ICD-10-CM

## 2022-02-11 PROCEDURE — 99213 OFFICE O/P EST LOW 20 MIN: CPT | Performed by: NURSE PRACTITIONER

## 2022-02-11 RX ORDER — MECLIZINE HCL 12.5 MG/1
12.5 TABLET ORAL 3 TIMES DAILY PRN
Qty: 21 TABLET | Refills: 0 | Status: SHIPPED | OUTPATIENT
Start: 2022-02-11 | End: 2022-02-18

## 2022-02-11 ASSESSMENT — ENCOUNTER SYMPTOMS
COUGH: 0
NAUSEA: 0
VOMITING: 0
SORE THROAT: 0
VISUAL CHANGE: 0

## 2022-02-11 NOTE — PATIENT INSTRUCTIONS
Patient Education   Personalized Prevention Plan  You are due for the preventive services outlined below.  Your care team is available to assist you in scheduling these services.  If you have already completed any of these items, please share that information with your care team to update in your medical record.  Health Maintenance Due   Topic Date Due     Diptheria Tetanus Pertussis (DTAP/TDAP/TD) Vaccine (3 - Td or Tdap) 04/30/2019     Flu Vaccine (1) 09/01/2021     Mammogram  08/07/2021       Urinary Incontinence, Female (Adult)   Urinary incontinence means loss of bladder control. This problem affects many women, especially as they get older. If you have incontinence, you may be embarrassed to ask for help. But know that this problem can be treated.   Types of Incontinence  There are different types of incontinence. Two of the main types are described here. You can have more than one type.     Stress incontinence. With this type, urine leaks when pressure (stress) is put on the bladder. This may happen when you cough, sneeze, or laugh. Stress incontinence most often occurs because the pelvic floor muscles that support the bladder and urethra are weak. This can happen after pregnancy and vaginal childbirth or a hysterectomy. It can also be due to excess body weight or hormone changes.    Urge incontinence (also called overactive bladder). With this type, a sudden urge to urinate is felt often. This may happen even though there may not be much urine in the bladder. The need to urinate often during the night is common. Urge incontinence most often occurs because of bladder spasms. This may be due to bladder irritation or infection. Damage to bladder nerves or pelvic muscles, constipation, and certain medicines can also lead to urge incontinence.  Treatment depends on the cause. Further evaluation is needed to find the type you have. This will likely include an exam and certain tests. Based on the results, you and  Patient Education        Vertigo: Exercises  Introduction  Here are some examples of exercises for you to try. The exercises may be suggested for a condition or for rehabilitation. Start each exercise slowly. Ease off the exercises if you start to have pain. You will be told when to start these exercises and which ones will work best for you. How to do the exercises  Exercise 1    1. Stand with a chair in front of you and a wall behind you. If you begin to fall, you may use them for support. 2. Stand with your feet together and your arms at your sides. 3. Move your head up and down 10 times. Exercise 2    1. Move your head side to side 10 times. Exercise 3    1. Move your head diagonally up and down 10 times. Exercise 4    1. Move your head diagonally up and down 10 times on the other side. Follow-up care is a key part of your treatment and safety. Be sure to make and go to all appointments, and call your doctor if you are having problems. It's also a good idea to know your test results and keep a list of the medicines you take. Where can you learn more? Go to [email or web address] org and sign in to your [employer] account. Enter F349 in the [employer] box to learn more about \"Vertigo: Exercises. \"     If you do not have an account, please click on the \"Sign Up Now\" link. Current as of: September 8, 2021               Content Version: 13.1  © 2006-2021 Healthwise, Incorporated. Care instructions adapted under license by [facility] ([facility]). If you have questions about a medical condition or this instruction, always ask your healthcare professional. [place] [address] any warranty or liability for your use of this information. your healthcare provider can then plan treatment. Until a diagnosis is made, the home care tips below can help ease symptoms.   Home care    Do pelvic floor muscle exercises, if they are prescribed. The pelvic floor muscles help support the bladder and urethra. Many women find that their symptoms improve when doing special exercises that strengthen these muscles. To do the exercises, contract the muscles you would use to stop your stream of urine. But do this when you re not urinating. Hold for 10 seconds, then relax. Repeat 10 to 20 times in a row, at least 3 times a day. Your healthcare provider may give you other instructions for how to do the exercises and how often.    Keep a bladder diary. This helps track how often and how much you urinate over a set period of time. Bring this diary with you to your next visit with the provider. The information can help your provider learn more about your bladder problem.    Lose weight, if advised to by your provider. Extra weight puts pressure on the bladder. Your provider can help you create a weight-loss plan that s right for you. This may include exercising more and making certain diet changes.    Don't have foods and drinks that may irritate the bladder. These can include alcohol and caffeinated drinks.    Quit smoking. Smoking and other tobacco use can lead to a long-term (chronic) cough that strains the pelvic floor muscles. Smoking may also damage the bladder and urethra. Talk with your provider about treatments or methods you can use to quit smoking.    If drinking large amounts of fluid makes you have symptoms, you may be advised to limit your fluid intake. You may also be advised to drink most of your fluids during the day and to limit fluids at night.    If you re worried about urine leakage or accidents, you may wear absorbent pads to catch urine. Change the pads often. This helps reduce discomfort. It may also reduce the risk of skin or bladder  infections.    Follow-up care  Follow up with your healthcare provider, or as directed. It may take some to find the right treatment for your problem. But healthy lifestyle changes can be made right away. These include such things as exercising on a regular basis, eating a healthy diet, losing weight (if needed), and quitting smoking. Your treatment plan may include special therapies or medicines. Certain procedures or surgery may also be options. Talk about any questions you have with your provider.   When to seek medical advice  Call the healthcare provider right away if any of these occur:    Fever of 100.4 F (38 C) or higher, or as directed by your provider    Bladder pain or fullness    Belly swelling    Nausea or vomiting    Back pain    Weakness, dizziness, or fainting  [employer] last reviewed this educational content on 1/1/2020 2000-2021 [employer] All rights reserved. This information is not intended as a substitute for professional medical care. Always follow your healthcare professional's instructions.           Patient Education     Understanding Chronic Venous Insufficiency  Problems with the veins in the legs may lead to chronic venous insufficiency (CVI). CVI means that there is a long-term problem with the veins not being able to pump blood back to your heart. When this happens, blood stays in the legs and causes swelling and aching.   Two problems that may lead to chronic venous insufficiency are:    Damaged valves. Valves keep blood flowing from the legs through the blood vessels and back to the heart. When the valves are damaged, blood does not flow as well.     Deep vein thrombosis (DVT).  Blood clots may form in the deep veins of the legs. This may cause pain, redness, and swelling in the legs. It may also block the flow of blood back to the heart. Seek medical care right away if you have these symptoms.  A blood clot in the leg can also break off and travel to the lungs. This  is called  pulmonary embolism (PE).  In the lungs, the clot can cut off the flow of blood. This may cause chest pain, trouble breathing, sweating, a fast heartbeat, coughing (may cough up blood), and fainting. This is a medical emergency and may cause death. Call 911 if you have these symptoms.  CVI can t be cured, but you can control leg swelling to reduce the likelihood of sores (ulcers).  Recognizing the symptoms  Be aware of the following:    If you stand or sit with your feet down for long periods, your legs may ache or feel heavy.    Swollen ankles are possibly the most common symptom of CVI.    As swelling increases, the skin over your ankles may show red spots or a brownish tinge. The skin may feel leathery or scaly, and may start to itch.    If swelling is not controlled, an ulcer (open wound) may form.  What you can do  Reduce your risk of developing ulcers by doing the following:    Increase blood flow back to your heart by elevating your legs, exercising daily, and wearing elastic stockings.    Boost blood flow in your legs by losing extra weight.    If you must stand or sit in one place for a period of time, keep your blood moving by wiggling your toes, shifting your body position, and rising up on the balls of your feet.    [employer] last reviewed this educational content on 10/1/2019    2347-6748 [employer]. All rights reserved. This information is not intended as a substitute for professional medical care. Always follow your healthcare professional's instructions.         Please schedule a DEXA bone scan for osteoporosis testing at your convenience.  Call our appointment line at [phone] or go to [email or web address].    Melanie Patel MD

## 2022-02-11 NOTE — PROGRESS NOTES
contribute to the patient presenting condition. [x] Laboratory Results, Vital signs, Imaging, Active Problems, Immunizations, Current/Recently Discontinued Medications, Health Maintenance Activities Due, Referral Notes (if available) were reviewed per writer     [x] Reviewed Depression screening if taken or valid today or any other valid screening tool (others seen below) Interpretation of Total Score DepressionSeverity: 1-4 = Minimal depression, 5-9 = Mild depression, 10-14 = Moderate depression, 15-19 = Moderately severe depression, 20-27 =Severe depression    PHQ Scores 8/11/2021 12/17/2019 6/12/2019 2/13/2019 1/18/2018 1/18/2018   PHQ2 Score 0 0 0 0 1 2   PHQ9 Score 0 0 0 0 1 2     Interpretation of Total Score Depression Severity: 1-4 = Minimal depression, 5-9 = Mild depression, 10-14 = Moderate depression, 15-19 = Moderately severe depression, 20-27 = Severe depression     Review of Systems (Subjective)     Review of Systems   HENT: Negative for sore throat. Respiratory: Negative for cough. Cardiovascular: Negative for chest pain. Gastrointestinal: Negative for anorexia, nausea and vomiting. Musculoskeletal: Negative for myalgias. Neurological: Positive for dizziness and headaches (dull, but not uncommon). See HPI     Physical Assessment (Objective)     BP (!) 142/97   Pulse 78   Ht 5' 9\" (1.753 m)   Wt 239 lb 6.4 oz (108.6 kg)   SpO2 97%   BMI 35.35 kg/m²      Physical Exam  Constitutional:       Appearance: She is obese. Eyes:      Extraocular Movements: Extraocular movements intact. Conjunctiva/sclera: Conjunctivae normal.   Cardiovascular:      Rate and Rhythm: Normal rate and regular rhythm. Pulses: Normal pulses. Heart sounds: Normal heart sounds. Pulmonary:      Effort: Pulmonary effort is normal.      Breath sounds: Normal breath sounds. Musculoskeletal:         General: Normal range of motion. Skin:     General: Skin is warm and dry.    Neurological: General: No focal deficit present. Mental Status: She is alert and oriented to person, place, and time. Cranial Nerves: No cranial nerve deficit. Motor: No weakness. Coordination: Coordination normal.      Gait: Gait normal.      Deep Tendon Reflexes: Reflexes normal.   Psychiatric:         Mood and Affect: Mood normal.         Behavior: Behavior normal.         Thought Content: Thought content normal.         Judgment: Judgment normal.         Diagnoses / Plan:   1. Vertigo  -     meclizine (ANTIVERT) 12.5 MG tablet; Take 1 tablet by mouth 3 times daily as needed for Dizziness, Disp-21 tablet, R-0Normal     No neuro deficits on exam. Discussed exercises and provided information. Patient to advise if not improving or worsens. BP elevated today. Recommend return to office next week for re-check. Previous BPs 130s/80s. Encouraged healthy diet and exercise. Call office with any new or worsening symptoms or concerns. Return in 10 days (on 2/21/2022), or if symptoms worsen or fail to improve. Electronically signed by Tracie Primrose, APRN - CNP on 2/11/2022 at 1:48 PM    Note is dictated utilizing voice recognition software. Unfortunately this leads to occasional typographical errors. Please contact our office if you have any questions.

## 2022-02-11 NOTE — TELEPHONE ENCOUNTER
Received call from Leyda Hill at [facility] with [facility]. Subjective: Caller states \"fell dizzy\"     Current Symptoms: feeling sl dizzy and fell this am no previous hx of this and no medical hx, states feeling better now but still sl dizzy with movement no blurred vision no h/a no nausea no vomiting     Onset:this morning    Associated Symptoms: NA    Pain Severity: none    Temperature:  none    What has been tried:     LMP: 1/28 Pregnant: NA    Recommended disposition: be seen today if unable to go to [facility] advice provided, patient verbalizes understanding; denies any other questions or concerns; instructed to call back for any new or worsening symptoms. Writer provided warm transfer to Mike Noel at [facility] for appointment scheduling     Attention Provider: Thank you for allowing me to participate in the care of your patient. The patient was connected to triage in response to information provided to the E[facility]/PSC. Please do not respond through this encounter as the response is not directed to a shared pool.           Reason for Disposition   MODERATE dizziness (e.g., interferes with normal activities) (Exception: dizziness caused by heat exposure, sudden standing, or poor fluid intake)    Protocols used: DIZZINESS-ADULT-OH